# Patient Record
Sex: MALE | Race: WHITE | NOT HISPANIC OR LATINO | URBAN - METROPOLITAN AREA
[De-identification: names, ages, dates, MRNs, and addresses within clinical notes are randomized per-mention and may not be internally consistent; named-entity substitution may affect disease eponyms.]

---

## 2017-01-03 ENCOUNTER — IMPORTED ENCOUNTER (OUTPATIENT)
Dept: URBAN - METROPOLITAN AREA CLINIC 43 | Facility: CLINIC | Age: 67
End: 2017-01-03

## 2017-01-03 PROBLEM — H00.025: Noted: 2017-01-03

## 2017-01-03 PROBLEM — H01.005: Noted: 2017-01-03

## 2017-01-03 PROBLEM — H11.153: Noted: 2017-01-03

## 2017-08-18 ENCOUNTER — IMPORTED ENCOUNTER (OUTPATIENT)
Dept: URBAN - METROPOLITAN AREA CLINIC 43 | Facility: CLINIC | Age: 67
End: 2017-08-18

## 2017-08-18 PROBLEM — H25.043: Noted: 2017-08-18

## 2017-08-18 PROBLEM — H25.013: Noted: 2017-08-18

## 2017-08-18 PROBLEM — H11.153: Noted: 2017-08-18

## 2017-10-25 ENCOUNTER — IMPORTED ENCOUNTER (OUTPATIENT)
Dept: URBAN - METROPOLITAN AREA CLINIC 43 | Facility: CLINIC | Age: 67
End: 2017-10-25

## 2020-04-18 ASSESSMENT — TONOMETRY
OD_IOP_MMHG: 14.0
OS_IOP_MMHG: 14.0
OD_IOP_MMHG: 15.0
OS_IOP_MMHG: 12.0

## 2020-04-18 ASSESSMENT — KERATOMETRY
OS_AXISANGLE2_DEGREES: 131
OS_K1POWER_DIOPTERS: 43.5
OS_AXISANGLE_DEGREES: 125
OD_K2POWER_DIOPTERS: 43
OD_AXISANGLE_DEGREES: 160
OD_K1POWER_DIOPTERS: 43.5
OD_AXISANGLE2_DEGREES: 70
OD_K1POWER_DIOPTERS: 43.25
OD_AXISANGLE_DEGREES: 180
OS_AXISANGLE_DEGREES: 41
OS_K1POWER_DIOPTERS: 43.25
OS_AXISANGLE2_DEGREES: 35
OD_AXISANGLE2_DEGREES: 90
OS_K2POWER_DIOPTERS: 43.5
OS_K2POWER_DIOPTERS: 42.75
OD_K2POWER_DIOPTERS: 43.25

## 2020-04-18 ASSESSMENT — VISUAL ACUITY
OS_CC: 20/40
OD_OTHER: 20/60.
OS_SC: 20/200
OD_SC: 20/400
OD_SC: J1+
OD_CC: 20/40 +2
OS_CC: 20/30 -1
OD_CC: 20/30 -2
OS_CC: 20/40 +2
OS_SC: J1+
OD_CC: 20/40
OS_OTHER: 20/70.

## 2022-05-16 ENCOUNTER — OFFICE VISIT (OUTPATIENT)
Dept: URBAN - METROPOLITAN AREA CLINIC 68 | Facility: CLINIC | Age: 72
End: 2022-05-16

## 2022-06-04 ENCOUNTER — TELEPHONE ENCOUNTER (OUTPATIENT)
Dept: URBAN - METROPOLITAN AREA CLINIC 68 | Facility: CLINIC | Age: 72
End: 2022-06-04

## 2022-06-04 RX ORDER — SULFASALAZINE 500 MG/1
TABLET, DELAYED RELEASE ORAL
Qty: 540 | Refills: 540 | OUTPATIENT
Start: 2018-01-02 | End: 2019-05-29

## 2022-06-04 RX ORDER — OMEPRAZOLE 20 MG/1
TABLET, DELAYED RELEASE ORAL DAILY
Qty: 90 | Refills: 90 | OUTPATIENT
Start: 2017-10-17 | End: 2018-03-06

## 2022-06-04 RX ORDER — CHOLESTYRAMINE 4 G/9G
QUESTRAN( 4GM ORAL  AS NEEDED ) INACTIVE -HX ENTRY POWDER, FOR SUSPENSION ORAL AS NEEDED
OUTPATIENT
Start: 2019-05-29

## 2022-06-04 RX ORDER — FAMOTIDINE 20 MG/1
TABLET, FILM COATED ORAL
Qty: 180 | Refills: 180 | OUTPATIENT
Start: 2018-11-28 | End: 2019-06-18

## 2022-06-04 RX ORDER — SULFASALAZINE 500 MG/1
TABLET ORAL
Qty: 540 | Refills: 540 | OUTPATIENT
Start: 2017-02-15 | End: 2018-01-02

## 2022-06-04 RX ORDER — SULFASALAZINE 500 MG/1
TABLET, DELAYED RELEASE ORAL
Qty: 540 | Refills: 540 | OUTPATIENT
Start: 2017-02-09 | End: 2017-02-15

## 2022-06-04 RX ORDER — RANITIDINE HYDROCHLORIDE 300 MG/1
CAPSULE ORAL
Qty: 180 | Refills: 180 | OUTPATIENT
Start: 2018-01-02 | End: 2019-05-29

## 2022-06-05 ENCOUNTER — TELEPHONE ENCOUNTER (OUTPATIENT)
Dept: URBAN - METROPOLITAN AREA CLINIC 68 | Facility: CLINIC | Age: 72
End: 2022-06-05

## 2022-06-05 RX ORDER — LISINOPRIL AND HYDROCHLOROTHIAZIDE TABLETS 20; 25 MG/1; MG/1
LISINOPRIL-HYDROCHLOROTHIAZIDE( 20-25MG ORAL 1 DAILY ) ACTIVE -HX ENTRY TABLET ORAL DAILY
Status: ACTIVE | COMMUNITY
Start: 2019-05-29

## 2022-06-05 RX ORDER — OMEPRAZOLE 20 MG/1
CAPSULE, DELAYED RELEASE ORAL
Qty: 60 | Refills: 60 | Status: ACTIVE | COMMUNITY
Start: 2019-10-10

## 2022-06-05 RX ORDER — FERROUS SULFATE 325(65) MG
VITAMIN D3( 2000UNIT ORAL  DAILY ) ACTIVE -HX ENTRY TABLET ORAL DAILY
Status: ACTIVE | COMMUNITY
Start: 2019-05-29

## 2022-06-05 RX ORDER — SULFASALAZINE 500 MG/1
TABLET, DELAYED RELEASE ORAL
Qty: 540 | Refills: 540 | Status: ACTIVE | COMMUNITY
Start: 2019-06-11

## 2022-06-21 ENCOUNTER — OFFICE VISIT (OUTPATIENT)
Dept: URBAN - METROPOLITAN AREA SURGERY CENTER 12 | Facility: SURGERY CENTER | Age: 72
End: 2022-06-21

## 2022-06-22 ENCOUNTER — LAB OUTSIDE AN ENCOUNTER (OUTPATIENT)
Age: 72
End: 2022-06-22

## 2022-06-22 LAB — 01: (no result)

## 2022-06-25 ENCOUNTER — TELEPHONE ENCOUNTER (OUTPATIENT)
Age: 72
End: 2022-06-25

## 2022-06-25 RX ORDER — OMEPRAZOLE 20 MG/1
CAPSULE, DELAYED RELEASE ORAL
Qty: 60 | Refills: 60 | OUTPATIENT
Start: 2019-10-10 | End: 2022-05-16

## 2022-06-25 RX ORDER — FAMOTIDINE 20 MG/1
TABLET ORAL
Qty: 180 | Refills: 180 | OUTPATIENT
Start: 2018-11-28 | End: 2019-06-18

## 2022-06-25 RX ORDER — SULFASALAZINE 500 MG/1
TABLET ORAL
Qty: 540 | Refills: 540 | OUTPATIENT
Start: 2017-02-15 | End: 2018-01-02

## 2022-06-25 RX ORDER — OMEPRAZOLE 20 MG/1
TABLET, DELAYED RELEASE ORAL DAILY
Qty: 90 | Refills: 90 | OUTPATIENT
Start: 2017-10-17 | End: 2018-03-06

## 2022-06-25 RX ORDER — SULFASALAZINE 500 MG/1
TABLET, DELAYED RELEASE ORAL
Qty: 540 | Refills: 540 | OUTPATIENT
Start: 2017-02-09 | End: 2017-02-15

## 2022-06-25 RX ORDER — SULFASALAZINE 500 MG/1
TABLET, DELAYED RELEASE ORAL
Qty: 540 | Refills: 540 | OUTPATIENT
Start: 2018-01-02 | End: 2019-05-29

## 2022-06-25 RX ORDER — CHOLESTYRAMINE 4 G/9G
QUESTRAN( 4GM ORAL  AS NEEDED ) INACTIVE -HX ENTRY POWDER, FOR SUSPENSION ORAL AS NEEDED
OUTPATIENT
Start: 2019-05-29

## 2022-06-26 ENCOUNTER — TELEPHONE ENCOUNTER (OUTPATIENT)
Age: 72
End: 2022-06-26

## 2022-06-26 RX ORDER — HYDROCHLOROTHIAZIDE 12.5 MG/1
HYDROCHLOROTHIAZIDE( 12.5MG ORAL 1 AT BEDTIME ) ACTIVE -HX ENTRY CAPSULE ORAL AT BEDTIME
Status: ACTIVE | COMMUNITY
Start: 2022-05-16

## 2022-06-26 RX ORDER — COLCHICINE 0.6 MG/1
COLCHICINE( 0.6MG ORAL   ) ACTIVE -HX ENTRY CAPSULE ORAL
Status: ACTIVE | COMMUNITY
Start: 2022-05-16

## 2022-06-26 RX ORDER — LISINOPRIL AND HYDROCHLOROTHIAZIDE TABLETS 20; 25 MG/1; MG/1
LISINOPRIL-HYDROCHLOROTHIAZIDE( 20-25MG ORAL 1 DAILY ) ACTIVE -HX ENTRY TABLET ORAL DAILY
Status: ACTIVE | COMMUNITY
Start: 2022-05-16

## 2022-06-26 RX ORDER — CHOLESTYRAMINE 4 G/9G
POWDER, FOR SUSPENSION ORAL
Status: ACTIVE | COMMUNITY
Start: 2022-05-16

## 2022-06-26 RX ORDER — FAMOTIDINE 40 MG/1
TABLET, FILM COATED ORAL DAILY
Qty: 90 | Refills: 90 | Status: ACTIVE | COMMUNITY
Start: 2022-05-16

## 2022-06-26 RX ORDER — CALCIUM CARBONATE/VITAMIN D3 600 MG-10
CALCIUM-VITAMIN D( 500MG ORAL 2 DAILY ) ACTIVE -HX ENTRY TABLET ORAL DAILY
Status: ACTIVE | COMMUNITY
Start: 2022-05-16

## 2022-06-26 RX ORDER — SULFASALAZINE 500 MG/1
TABLET, DELAYED RELEASE ORAL
Qty: 540 | Refills: 540 | Status: ACTIVE | COMMUNITY
Start: 2022-05-16

## 2022-06-26 RX ORDER — MULTIVITAMIN/IRON/FOLIC ACID 18MG-0.4MG
CENTRUM(  ORAL  DAILY ) ACTIVE -HX ENTRY TABLET ORAL DAILY
Status: ACTIVE | COMMUNITY
Start: 2022-05-16

## 2022-06-26 RX ORDER — ALLOPURINOL 100 MG/1
ALLOPURINOL( 100MG ORAL 2 DAILY ) ACTIVE -HX ENTRY TABLET ORAL DAILY
Status: ACTIVE | COMMUNITY
Start: 2022-05-16

## 2022-06-26 RX ORDER — GLUCOSAMINE/MSM/CHONDROIT SULF 500-166.6
VITAMIN D3( 2000UNIT ORAL  DAILY ) ACTIVE -HX ENTRY TABLET ORAL DAILY
Status: ACTIVE | COMMUNITY
Start: 2022-05-16

## 2022-06-30 ENCOUNTER — TELEPHONE ENCOUNTER (OUTPATIENT)
Dept: URBAN - METROPOLITAN AREA CLINIC 68 | Facility: CLINIC | Age: 72
End: 2022-06-30

## 2022-09-22 ENCOUNTER — TELEPHONE ENCOUNTER (OUTPATIENT)
Dept: URBAN - METROPOLITAN AREA CLINIC 68 | Facility: CLINIC | Age: 72
End: 2022-09-22

## 2022-09-22 ENCOUNTER — OFFICE VISIT (OUTPATIENT)
Dept: URBAN - METROPOLITAN AREA CLINIC 68 | Facility: CLINIC | Age: 72
End: 2022-09-22

## 2022-09-22 RX ORDER — COLCHICINE 0.6 MG/1
TABLET, FILM COATED ORAL
Qty: 90 TABLET | Status: ACTIVE | COMMUNITY

## 2022-09-22 RX ORDER — OMEPRAZOLE 20 MG/1
CAPSULE, DELAYED RELEASE ORAL
Qty: 60 | Refills: 60 | Status: ACTIVE | COMMUNITY
Start: 2019-10-10

## 2022-09-22 RX ORDER — SULFASALAZINE 500 MG/1
TABLET, DELAYED RELEASE ORAL
OUTPATIENT
Start: 2019-06-11

## 2022-09-22 RX ORDER — HYDROCORTISONE 25 MG/G
1 APPLICATION CREAM TOPICAL ONCE A DAY
Qty: 1 EACH | Refills: 0
End: 2022-10-22

## 2022-09-22 RX ORDER — ALLOPURINOL 100 MG/1
TABLET ORAL
Qty: 180 TABLET | Status: ACTIVE | COMMUNITY

## 2022-09-22 RX ORDER — LISINOPRIL AND HYDROCHLOROTHIAZIDE TABLETS 20; 25 MG/1; MG/1
LISINOPRIL-HYDROCHLOROTHIAZIDE( 20-25MG ORAL 1 DAILY ) ACTIVE -HX ENTRY TABLET ORAL DAILY
Status: ON HOLD | COMMUNITY
Start: 2019-05-29

## 2022-09-22 RX ORDER — HYDROCORTISONE 25 MG/G
1 APPLICATION CREAM TOPICAL ONCE A DAY
Qty: 1 EACH | Refills: 5 | OUTPATIENT

## 2022-09-22 RX ORDER — SULFASALAZINE 500 MG/1
TABLET, DELAYED RELEASE ORAL
Qty: 540 | Refills: 540 | Status: ACTIVE | COMMUNITY
Start: 2019-06-11

## 2022-09-22 RX ORDER — FAMOTIDINE 40 MG/1
TABLET, FILM COATED ORAL
Qty: 90 TABLET | Status: ACTIVE | COMMUNITY

## 2022-09-22 RX ORDER — LISINOPRIL 20 MG/1
1 TABLET TABLET ORAL ONCE A DAY
Status: ACTIVE | COMMUNITY

## 2022-09-22 RX ORDER — FERROUS SULFATE 325(65) MG
VITAMIN D3( 2000UNIT ORAL  DAILY ) ACTIVE -HX ENTRY TABLET ORAL DAILY
Status: ACTIVE | COMMUNITY
Start: 2019-05-29

## 2022-09-22 NOTE — HPI-MIGRATED HPI
General : Crohn's History  in clinical remission, on maintenance therapy  Doing well no new complaints , Colonoscopy revealed no significant findings,  all biopsies quiescent ,  does get chronic occas anal irritation , no bleeding or pain, relief with 2.5% hydro cortisone cream prn

## 2022-10-18 ENCOUNTER — TELEPHONE ENCOUNTER (OUTPATIENT)
Dept: URBAN - METROPOLITAN AREA CLINIC 68 | Facility: CLINIC | Age: 72
End: 2022-10-18

## 2022-10-19 ENCOUNTER — TELEPHONE ENCOUNTER (OUTPATIENT)
Dept: URBAN - METROPOLITAN AREA CLINIC 68 | Facility: CLINIC | Age: 72
End: 2022-10-19

## 2022-10-25 ENCOUNTER — OFFICE VISIT (OUTPATIENT)
Dept: URBAN - METROPOLITAN AREA CLINIC 68 | Facility: CLINIC | Age: 72
End: 2022-10-25

## 2022-10-25 RX ORDER — ALLOPURINOL 100 MG/1
TABLET ORAL
Qty: 180 TABLET | Status: ACTIVE | COMMUNITY

## 2022-10-25 RX ORDER — LISINOPRIL 20 MG/1
1 TABLET TABLET ORAL ONCE A DAY
Status: ACTIVE | COMMUNITY

## 2022-10-25 RX ORDER — LISINOPRIL AND HYDROCHLOROTHIAZIDE TABLETS 20; 25 MG/1; MG/1
LISINOPRIL-HYDROCHLOROTHIAZIDE( 20-25MG ORAL 1 DAILY ) ACTIVE -HX ENTRY TABLET ORAL DAILY
Status: ON HOLD | COMMUNITY
Start: 2019-05-29

## 2022-10-25 RX ORDER — FAMOTIDINE 40 MG/1
TABLET, FILM COATED ORAL
Qty: 90 TABLET | Status: ACTIVE | COMMUNITY

## 2022-10-25 RX ORDER — SULFASALAZINE 500 MG/1
TABLET, DELAYED RELEASE ORAL
Status: ACTIVE | COMMUNITY
Start: 2019-06-11

## 2022-10-25 RX ORDER — FERROUS SULFATE 325(65) MG
VITAMIN D3( 2000UNIT ORAL  DAILY ) ACTIVE -HX ENTRY TABLET ORAL DAILY
Status: ACTIVE | COMMUNITY
Start: 2019-05-29

## 2022-10-25 RX ORDER — COLCHICINE 0.6 MG/1
TABLET, FILM COATED ORAL
Qty: 90 TABLET | Status: ACTIVE | COMMUNITY

## 2022-10-25 RX ORDER — OMEPRAZOLE 20 MG/1
CAPSULE, DELAYED RELEASE ORAL
Qty: 60 | Refills: 60 | Status: ACTIVE | COMMUNITY
Start: 2019-10-10

## 2022-10-25 NOTE — HPI-MIGRATED HPI
General : Pt wife was diagnosed with H pylori and is concerned he also has infection  No severe abd pain however possible dyspepsia

## 2022-11-02 ENCOUNTER — NEW PATIENT (OUTPATIENT)
Dept: URBAN - METROPOLITAN AREA CLINIC 32 | Facility: CLINIC | Age: 72
End: 2022-11-02

## 2022-11-02 DIAGNOSIS — H57.89: ICD-10-CM

## 2022-11-02 PROCEDURE — 99203 OFFICE O/P NEW LOW 30 MIN: CPT

## 2022-11-02 ASSESSMENT — KERATOMETRY
OS_K1POWER_DIOPTERS: 43.25
OD_K2POWER_DIOPTERS: 43.25
OS_AXISANGLE2_DEGREES: 131
OD_AXISANGLE2_DEGREES: 70
OS_K2POWER_DIOPTERS: 43.5
OD_K1POWER_DIOPTERS: 43.5
OS_AXISANGLE_DEGREES: 41
OD_AXISANGLE_DEGREES: 160

## 2022-11-02 ASSESSMENT — TONOMETRY
OS_IOP_MMHG: 12
OD_IOP_MMHG: 13

## 2022-11-02 ASSESSMENT — VISUAL ACUITY
OS_CC: 20/20-2
OS_SC: J1+
OD_SC: J1-1
OD_CC: 20/20-1

## 2022-12-15 ENCOUNTER — OFFICE VISIT (OUTPATIENT)
Dept: URBAN - METROPOLITAN AREA CLINIC 68 | Facility: CLINIC | Age: 72
End: 2022-12-15

## 2023-01-11 ENCOUNTER — LAB OUTSIDE AN ENCOUNTER (OUTPATIENT)
Dept: URBAN - METROPOLITAN AREA CLINIC 68 | Facility: CLINIC | Age: 73
End: 2023-01-11

## 2023-01-13 LAB — HELICOBACTER PYLORI,: NOT DETECTED

## 2023-03-14 ENCOUNTER — OFFICE VISIT (OUTPATIENT)
Dept: URBAN - METROPOLITAN AREA CLINIC 68 | Facility: CLINIC | Age: 73
End: 2023-03-14

## 2023-05-08 ENCOUNTER — ERX REFILL RESPONSE (OUTPATIENT)
Dept: URBAN - METROPOLITAN AREA SURGERY CENTER 12 | Facility: SURGERY CENTER | Age: 73
End: 2023-05-08

## 2023-05-08 RX ORDER — FAMOTIDINE 40 MG/1
TABLET, FILM COATED ORAL
Qty: 90 TABLET | OUTPATIENT

## 2023-05-08 RX ORDER — FAMOTIDINE 40 MG/1
TAKE 1 TABLET BY MOUTH EVERY DAY TABLET, FILM COATED ORAL
Qty: 90 TABLET | Refills: 3 | OUTPATIENT

## 2023-05-31 ENCOUNTER — TELEPHONE ENCOUNTER (OUTPATIENT)
Dept: URBAN - METROPOLITAN AREA CLINIC 68 | Facility: CLINIC | Age: 73
End: 2023-05-31

## 2023-05-31 RX ORDER — FAMOTIDINE 40 MG/1
TAKE 1 TABLET BY MOUTH EVERY DAY TABLET, FILM COATED ORAL
Qty: 90 TABLET | Refills: 3

## 2023-05-31 RX ORDER — SULFASALAZINE 500 MG/1
1 TABLET TABLET, DELAYED RELEASE ORAL TWICE A DAY
Qty: 180 TABLET | Refills: 3
Start: 2019-06-11 | End: 2024-05-25

## 2023-06-02 ENCOUNTER — ERX REFILL RESPONSE (OUTPATIENT)
Dept: URBAN - METROPOLITAN AREA SURGERY CENTER 12 | Facility: SURGERY CENTER | Age: 73
End: 2023-06-02

## 2023-06-02 RX ORDER — FAMOTIDINE 40 MG/1
TAKE 1 TABLET BY MOUTH EVERY DAY TABLET, FILM COATED ORAL
Qty: 90 TABLET | Refills: 3 | OUTPATIENT

## 2023-07-02 ENCOUNTER — TELEPHONE ENCOUNTER (OUTPATIENT)
Dept: URBAN - METROPOLITAN AREA CLINIC 68 | Facility: CLINIC | Age: 73
End: 2023-07-02

## 2023-07-02 RX ORDER — SULFASALAZINE 500 MG/1
2 TABLETS TABLET, DELAYED RELEASE ORAL
Qty: 540 TABLET | Refills: 3
Start: 2019-06-11 | End: 2024-06-26

## 2023-07-03 ENCOUNTER — TELEPHONE ENCOUNTER (OUTPATIENT)
Dept: URBAN - METROPOLITAN AREA CLINIC 68 | Facility: CLINIC | Age: 73
End: 2023-07-03

## 2023-09-06 ENCOUNTER — COMPREHENSIVE EXAM (OUTPATIENT)
Dept: URBAN - METROPOLITAN AREA CLINIC 32 | Facility: CLINIC | Age: 73
End: 2023-09-06

## 2023-09-06 DIAGNOSIS — H43.813: ICD-10-CM

## 2023-09-06 DIAGNOSIS — H35.363: ICD-10-CM

## 2023-09-06 DIAGNOSIS — H25.013: ICD-10-CM

## 2023-09-06 DIAGNOSIS — H16.223: ICD-10-CM

## 2023-09-06 PROCEDURE — 99214 OFFICE O/P EST MOD 30 MIN: CPT

## 2023-09-06 ASSESSMENT — KERATOMETRY
OD_AXISANGLE_DEGREES: 160
OD_K2POWER_DIOPTERS: 43.25
OS_K1POWER_DIOPTERS: 43.25
OS_AXISANGLE2_DEGREES: 131
OS_AXISANGLE_DEGREES: 41
OD_K1POWER_DIOPTERS: 43.5
OD_AXISANGLE2_DEGREES: 70
OS_K2POWER_DIOPTERS: 43.5

## 2023-09-06 ASSESSMENT — VISUAL ACUITY
OS_SC: J1
OS_CC: 20/20-2
OD_CC: 20/25
OD_SC: J1

## 2023-09-06 ASSESSMENT — TONOMETRY
OD_IOP_MMHG: 12
OS_IOP_MMHG: 13

## 2023-11-08 ENCOUNTER — DASHBOARD ENCOUNTERS (OUTPATIENT)
Age: 73
End: 2023-11-08

## 2023-11-08 ENCOUNTER — OFFICE VISIT (OUTPATIENT)
Dept: URBAN - METROPOLITAN AREA CLINIC 68 | Facility: CLINIC | Age: 73
End: 2023-11-08
Payer: MEDICARE

## 2023-11-08 VITALS
HEIGHT: 66 IN | RESPIRATION RATE: 18 BRPM | DIASTOLIC BLOOD PRESSURE: 70 MMHG | TEMPERATURE: 97.7 F | SYSTOLIC BLOOD PRESSURE: 108 MMHG | OXYGEN SATURATION: 99 % | HEART RATE: 78 BPM | WEIGHT: 154 LBS | BODY MASS INDEX: 24.75 KG/M2

## 2023-11-08 DIAGNOSIS — K21.9 GASTRO-ESOPHAGEAL REFLUX DISEASE WITHOUT ESOPHAGITIS: ICD-10-CM

## 2023-11-08 DIAGNOSIS — K50.80 CROHN'S DISEASE OF BOTH SMALL AND LARGE INTESTINE WITHOUT COMPLICATIONS: ICD-10-CM

## 2023-11-08 PROCEDURE — 99214 OFFICE O/P EST MOD 30 MIN: CPT | Performed by: SPECIALIST

## 2023-11-08 RX ORDER — COLCHICINE 0.6 MG/1
TABLET, FILM COATED ORAL
Qty: 90 TABLET | Status: ON HOLD | COMMUNITY

## 2023-11-08 RX ORDER — ALLOPURINOL 100 MG/1
TABLET ORAL
Qty: 180 TABLET | Status: ACTIVE | COMMUNITY

## 2023-11-08 RX ORDER — LISINOPRIL 20 MG/1
1 TABLET TABLET ORAL ONCE A DAY
Status: ACTIVE | COMMUNITY

## 2023-11-08 RX ORDER — FERROUS SULFATE 325(65) MG
VITAMIN D3( 2000UNIT ORAL  DAILY ) ACTIVE -HX ENTRY TABLET ORAL DAILY
Status: ACTIVE | COMMUNITY
Start: 2019-05-29

## 2023-11-08 RX ORDER — SULFASALAZINE 500 MG/1
2 TABLETS TABLET, DELAYED RELEASE ORAL
Qty: 360 SWAB | Refills: 3

## 2023-11-08 RX ORDER — FAMOTIDINE 40 MG/1
TAKE 1 TABLET BY MOUTH EVERY DAY TABLET, FILM COATED ORAL
Qty: 90 TABLET | Refills: 3 | Status: ACTIVE | COMMUNITY

## 2023-11-08 RX ORDER — LISINOPRIL AND HYDROCHLOROTHIAZIDE TABLETS 20; 25 MG/1; MG/1
LISINOPRIL-HYDROCHLOROTHIAZIDE( 20-25MG ORAL 1 DAILY ) ACTIVE -HX ENTRY TABLET ORAL DAILY
Status: ON HOLD | COMMUNITY
Start: 2019-05-29

## 2023-11-08 RX ORDER — SULFASALAZINE 500 MG/1
2 TABLETS TABLET, DELAYED RELEASE ORAL
Qty: 540 TABLET | Refills: 3 | Status: ACTIVE | COMMUNITY
Start: 2019-06-11 | End: 2024-06-26

## 2023-11-08 RX ORDER — FAMOTIDINE 40 MG/1
TAKE 1 TABLET BY MOUTH EVERY DAY TABLET, FILM COATED ORAL ONCE A DAY
Qty: 90 TABLET | Refills: 3

## 2023-11-08 NOTE — HPI-MIGRATED HPI
Patient is doing well no sign of significant relapse or flareup occasional minor diarrhea is treated with cholestyramine powder effectivelyContinues sulfasalazine to twice dailyLast colonoscopy revealed no active inflammation or microscopic inflammation or dysplasia  IMPRESSION Crohn's in clinical remission endoscopic remission on maintenance therapy Mild gastroesophageal reflux controlled with famotidine occasional Prilosec for breakthrough no alarm symptoms last endoscopy 2017 negative for Negro's PLANSulfasalazine to twice dailySurveillance colonoscopy 2024Famotidine 40 mg dailyCholestyramine/Prevalite as neededReturn for alarm symptoms dysphagia or bleeding patient is aware General : Pt wife was diagnosed with H pylori and is concerned he also has infection  No severe abd pain however possible dyspepsia General : Crohn's History  in clinical remission, on maintenance therapy  Doing well no new complaints , Colonoscopy revealed no significant findings,  all biopsies quiescent ,  does get chronic occas anal irritation , no bleeding or pain, relief with 2.5% hydro cortisone cream prn

## 2024-07-11 ENCOUNTER — OFFICE VISIT (OUTPATIENT)
Dept: URBAN - METROPOLITAN AREA CLINIC 68 | Facility: CLINIC | Age: 74
End: 2024-07-11

## 2024-07-11 RX ORDER — LISINOPRIL 20 MG/1
1 TABLET TABLET ORAL ONCE A DAY
COMMUNITY

## 2024-07-11 RX ORDER — COLCHICINE 0.6 MG/1
TABLET, FILM COATED ORAL
Qty: 90 TABLET | COMMUNITY

## 2024-07-11 RX ORDER — SULFASALAZINE 500 MG/1
2 TABLETS TABLET, DELAYED RELEASE ORAL
Qty: 360 SWAB | Refills: 3 | COMMUNITY

## 2024-07-11 RX ORDER — FERROUS SULFATE 325(65) MG
VITAMIN D3( 2000UNIT ORAL  DAILY ) ACTIVE -HX ENTRY TABLET ORAL DAILY
COMMUNITY
Start: 2019-05-29

## 2024-07-11 RX ORDER — FAMOTIDINE 40 MG/1
TAKE 1 TABLET BY MOUTH EVERY DAY TABLET, FILM COATED ORAL ONCE A DAY
Qty: 90 TABLET | Refills: 3 | COMMUNITY

## 2024-07-11 RX ORDER — ALLOPURINOL 100 MG/1
TABLET ORAL
Qty: 180 TABLET | COMMUNITY

## 2024-07-11 RX ORDER — LISINOPRIL AND HYDROCHLOROTHIAZIDE TABLETS 20; 25 MG/1; MG/1
LISINOPRIL-HYDROCHLOROTHIAZIDE( 20-25MG ORAL 1 DAILY ) ACTIVE -HX ENTRY TABLET ORAL DAILY
COMMUNITY
Start: 2019-05-29

## 2024-07-25 ENCOUNTER — OFFICE VISIT (OUTPATIENT)
Dept: URBAN - METROPOLITAN AREA CLINIC 68 | Facility: CLINIC | Age: 74
End: 2024-07-25

## 2024-07-25 VITALS
WEIGHT: 155 LBS | HEART RATE: 75 BPM | BODY MASS INDEX: 24.91 KG/M2 | OXYGEN SATURATION: 98 % | DIASTOLIC BLOOD PRESSURE: 83 MMHG | HEIGHT: 66 IN | SYSTOLIC BLOOD PRESSURE: 132 MMHG

## 2024-07-25 RX ORDER — CHOLESTYRAMINE 4 G/9G
1 PACKET MIXED WITH WATER OR NON-CARBONATED DRINK POWDER, FOR SUSPENSION ORAL ONCE A DAY
Status: ACTIVE | COMMUNITY

## 2024-07-25 RX ORDER — SULFASALAZINE 500 MG/1
2 TABLETS TABLET, DELAYED RELEASE ORAL
Qty: 360 SWAB | Refills: 3

## 2024-07-25 RX ORDER — LISINOPRIL 2.5 MG/1
1 TABLET TABLET ORAL ONCE A DAY
Status: ACTIVE | COMMUNITY

## 2024-07-25 RX ORDER — COLCHICINE 0.6 MG/1
TABLET, FILM COATED ORAL
Qty: 90 TABLET | Status: ON HOLD | COMMUNITY

## 2024-07-25 RX ORDER — HYDROCHLOROTHIAZIDE 12.5 MG/1
1 TABLET IN THE MORNING TABLET ORAL ONCE A DAY
Status: ACTIVE | COMMUNITY

## 2024-07-25 RX ORDER — HYDROCORTISONE 25 MG/G
1 APPLICATION CREAM TOPICAL ONCE A DAY
Status: ACTIVE | COMMUNITY

## 2024-07-25 RX ORDER — SULFASALAZINE 500 MG/1
2 TABLETS TABLET, DELAYED RELEASE ORAL
Qty: 360 SWAB | Refills: 3 | Status: ACTIVE | COMMUNITY

## 2024-07-25 RX ORDER — HYDROCORTISONE 25 MG/G
1 APPLICATION CREAM TOPICAL ONCE A DAY
Qty: 30 GRAM | Refills: 3

## 2024-07-25 RX ORDER — FAMOTIDINE 40 MG/1
TAKE 1 TABLET BY MOUTH EVERY DAY TABLET, FILM COATED ORAL ONCE A DAY
Qty: 90 TABLET | Refills: 3 | Status: ACTIVE | COMMUNITY

## 2024-07-25 RX ORDER — ALLOPURINOL 100 MG/1
TABLET ORAL
Qty: 180 TABLET | Status: ACTIVE | COMMUNITY

## 2024-07-25 RX ORDER — FERROUS SULFATE 325(65) MG
VITAMIN D3( 2000UNIT ORAL  DAILY ) ACTIVE -HX ENTRY TABLET ORAL DAILY
Status: ACTIVE | COMMUNITY
Start: 2019-05-29

## 2024-07-25 RX ORDER — FAMOTIDINE 40 MG/1
TAKE 1 TABLET BY MOUTH EVERY DAY TABLET, FILM COATED ORAL ONCE A DAY
Qty: 90 TABLET | Refills: 3

## 2024-07-25 RX ORDER — CHOLESTYRAMINE 4 G/9G
1 SCOOP POWDER, FOR SUSPENSION ORAL ONCE A DAY
Qty: 90 | Refills: 3 | OUTPATIENT
Start: 2024-07-25

## 2024-07-25 NOTE — HPI-MIGRATED HPI
7/25   LAST COLON SURVEILLANCE WAS 6/2022  In clinical remission at this time  No bleeding or diarrhea  cholestyramine and sulfasalazine  takes cholestyramine packets as needed ar[place] when travelling Paper Rx was provided  Last flare 2000?   on treatment since age 28  Surgery age 29 and further surgery later  IMP  Crohn's in deep remission  FU surveillance every 3 yrs due to quiescent nature of the Crohn's  CONT  sulfasalazine 2 BID  dequan prn  hydrocortisone cream for hemorrhoids   PRIOR  Patient is doing well no sign of significant relapse or flareup occasional minor diarrhea is treated with cholestyramine powder effectively  Continues sulfasalazine to twice daily Last colonoscopy revealed no active inflammation or microscopic inflammation or dysplasia  IMPRESSION Crohn's in clinical remission endoscopic remission on maintenance therapy Mild gastroesophageal reflux controlled with famotidine occasional Prilosec for breakthrough no alarm symptoms last endoscopy 2017 negative for Negro's PLAN Sulfasalazine to twice daily Surveillance colonoscopy 2024Famotidine 40 mg daily Cholestyramine/Prevalite as needed Return for alarm symptoms dysphagia or bleeding patient is aware General : Pt wife was diagnosed with H pylori and is concerned he also has infection  No severe ABD pain however possible dyspepsia General : Crohn's History  in clinical remission, on maintenance therapy  Doing well no new complaints, Colonoscopy revealed no significant findings,  all biopsies quiescent,  does get chronic occas anal irritation, no bleeding or pain, relief with 2.5% hydro cortisone cream prn

## 2024-09-09 ENCOUNTER — COMPREHENSIVE EXAM (OUTPATIENT)
Dept: URBAN - METROPOLITAN AREA CLINIC 32 | Facility: CLINIC | Age: 74
End: 2024-09-09

## 2024-09-09 DIAGNOSIS — H16.223: ICD-10-CM

## 2024-09-09 DIAGNOSIS — H43.813: ICD-10-CM

## 2024-09-09 DIAGNOSIS — H25.013: ICD-10-CM

## 2024-09-09 DIAGNOSIS — H35.361: ICD-10-CM

## 2024-09-09 PROCEDURE — 99214 OFFICE O/P EST MOD 30 MIN: CPT

## 2024-09-09 PROCEDURE — 92134 CPTRZ OPH DX IMG PST SGM RTA: CPT
